# Patient Record
Sex: FEMALE | Race: WHITE | Employment: UNEMPLOYED | URBAN - METROPOLITAN AREA
[De-identification: names, ages, dates, MRNs, and addresses within clinical notes are randomized per-mention and may not be internally consistent; named-entity substitution may affect disease eponyms.]

---

## 2024-01-03 ENCOUNTER — TELEPHONE (OUTPATIENT)
Dept: ADMINISTRATIVE | Age: 24
End: 2024-01-03

## 2024-01-03 NOTE — TELEPHONE ENCOUNTER
Pt called and is 24 weeks pregnant.  Her due date is 4/22.  She was being seen by [facility]'s [place] until her insurance changed to [facility] medicaid.  They do not accept that insurance.  No availability until March.  Staff unavailable due to pt care.  Please contact pt.

## 2024-01-16 ENCOUNTER — TELEPHONE (OUTPATIENT)
Dept: OBGYN | Age: 24
End: 2024-01-16

## 2024-01-16 DIAGNOSIS — Z32.01 POSITIVE PREGNANCY TEST: Primary | ICD-10-CM

## 2024-01-16 NOTE — TELEPHONE ENCOUNTER
[facility] can not get patient in with [facility] for viability scan before her appointment on 1/29/24. Do you want her to go to radiology for one. If so, can you put in an order.

## 2024-01-29 ENCOUNTER — HOSPITAL ENCOUNTER (OUTPATIENT)
Age: 24
Discharge: HOME | End: 2024-01-29
Payer: COMMERCIAL

## 2024-01-29 ENCOUNTER — INITIAL PRENATAL (OUTPATIENT)
Dept: OBGYN | Age: 24
End: 2024-01-29
Payer: COMMERCIAL

## 2024-01-29 VITALS
WEIGHT: 148.9 LBS | DIASTOLIC BLOOD PRESSURE: 72 MMHG | BODY MASS INDEX: 30.02 KG/M2 | SYSTOLIC BLOOD PRESSURE: 105 MMHG | HEIGHT: 59 IN | HEART RATE: 97 BPM

## 2024-01-29 DIAGNOSIS — Z34.93 ENCOUNTER FOR SUPERVISION OF NORMAL PREGNANCY IN THIRD TRIMESTER, UNSPECIFIED GRAVIDITY: Primary | ICD-10-CM

## 2024-01-29 DIAGNOSIS — F17.210 CIGARETTE SMOKER ONE HALF PACK A DAY OR LESS: ICD-10-CM

## 2024-01-29 DIAGNOSIS — Z3A.28 PREGNANCY WITH 28 COMPLETED WEEKS GESTATION: ICD-10-CM

## 2024-01-29 PROBLEM — Z86.19 HISTORY OF POSITIVE PCR FOR HERPES SIMPLEX VIRUS TYPE 2 (HSV-2) DNA: Status: ACTIVE | Noted: 2023-10-23

## 2024-01-29 LAB
ABO + RH BLD: NORMAL
BLOOD GROUP ANTIBODIES SERPL: NEGATIVE
COMPONENT: NORMAL
ERYTHROCYTE [DISTWIDTH] IN BLOOD BY AUTOMATED COUNT: 12.8 % (ref 11.5–15)
GLUCOSE URINE, POC: NEGATIVE
HCT VFR BLD AUTO: 35.7 % (ref 34–48)
HGB BLD-MCNC: 11.9 G/DL (ref 11.5–15.5)
HISTORY CHECK: NORMAL
Lab: 1
MCH RBC QN AUTO: 31.9 PG (ref 26–35)
MCHC RBC AUTO-ENTMCNC: 33.3 G/DL (ref 32–34.5)
MCV RBC AUTO: 95.7 FL (ref 80–99.9)
PLATELET # BLD AUTO: 281 K/UL (ref 130–450)
PMV BLD AUTO: 10.4 FL (ref 7–12)
PROTEIN UA: NEGATIVE
RBC # BLD AUTO: 3.73 M/UL (ref 3.5–5.5)
WBC OTHER # BLD: 15.4 K/UL (ref 4.5–11.5)

## 2024-01-29 PROCEDURE — 86901 BLOOD TYPING SEROLOGIC RH(D): CPT

## 2024-01-29 PROCEDURE — 81002 URINALYSIS NONAUTO W/O SCOPE: CPT | Performed by: MIDWIFE

## 2024-01-29 PROCEDURE — 85027 COMPLETE CBC AUTOMATED: CPT

## 2024-01-29 PROCEDURE — 99203 OFFICE O/P NEW LOW 30 MIN: CPT | Performed by: MIDWIFE

## 2024-01-29 PROCEDURE — 86850 RBC ANTIBODY SCREEN: CPT

## 2024-01-29 PROCEDURE — 86900 BLOOD TYPING SEROLOGIC ABO: CPT

## 2024-01-29 PROCEDURE — 86592 SYPHILIS TEST NON-TREP QUAL: CPT

## 2024-01-29 PROCEDURE — 36415 COLL VENOUS BLD VENIPUNCTURE: CPT

## 2024-01-29 PROCEDURE — 90715 TDAP VACCINE 7 YRS/> IM: CPT | Performed by: MIDWIFE

## 2024-01-29 RX ORDER — ONDANSETRON 4 MG/1
TABLET, FILM COATED ORAL
COMMUNITY
Start: 2023-12-02

## 2024-01-29 RX ORDER — FAMOTIDINE 20 MG/1
20 TABLET, FILM COATED ORAL 2 TIMES DAILY
COMMUNITY

## 2024-01-29 RX ORDER — OMEPRAZOLE 20 MG/1
20 CAPSULE, DELAYED RELEASE ORAL DAILY
COMMUNITY
Start: 2023-12-18

## 2024-01-29 RX ORDER — MELATONIN
1 DAILY
COMMUNITY
Start: 2023-12-18

## 2024-01-29 SDOH — ECONOMIC STABILITY: INCOME INSECURITY: HOW HARD IS IT FOR YOU TO PAY FOR THE VERY BASICS LIKE FOOD, HOUSING, MEDICAL CARE, AND HEATING?: NOT HARD AT ALL

## 2024-01-29 SDOH — ECONOMIC STABILITY: HOUSING INSECURITY
IN THE LAST 12 MONTHS, WAS THERE A TIME WHEN YOU DID NOT HAVE A STEADY PLACE TO SLEEP OR SLEPT IN A SHELTER (INCLUDING NOW)?: NO

## 2024-01-29 SDOH — ECONOMIC STABILITY: FOOD INSECURITY: WITHIN THE PAST 12 MONTHS, YOU WORRIED THAT YOUR FOOD WOULD RUN OUT BEFORE YOU GOT MONEY TO BUY MORE.: NEVER TRUE

## 2024-01-29 SDOH — ECONOMIC STABILITY: FOOD INSECURITY: WITHIN THE PAST 12 MONTHS, THE FOOD YOU BOUGHT JUST DIDN'T LAST AND YOU DIDN'T HAVE MONEY TO GET MORE.: NEVER TRUE

## 2024-01-29 ASSESSMENT — PATIENT HEALTH QUESTIONNAIRE - PHQ9
2. FEELING DOWN, DEPRESSED OR HOPELESS: 0
SUM OF ALL RESPONSES TO PHQ9 QUESTIONS 1 & 2: 0
SUM OF ALL RESPONSES TO PHQ QUESTIONS 1-9: 0
1. LITTLE INTEREST OR PLEASURE IN DOING THINGS: 0

## 2024-01-30 LAB — RPR SER QL: NONREACTIVE

## 2024-01-31 ENCOUNTER — TELEPHONE (OUTPATIENT)
Dept: OBGYN | Age: 24
End: 2024-01-31

## 2024-01-31 NOTE — TELEPHONE ENCOUNTER
----- Message from Sneha Eckert APRN - CNM sent at 1/31/2024  2:23 PM EST -----  Leukocytosis, needs repeat CBC next visit.  Has she been sick with cold, etc?

## 2024-02-02 NOTE — PROGRESS NOTES
New OB Patient alert and pleasant today with no complaints.  Here today for prenatal visit.  Urine for glucose and protein obtained with negative results. Patient signed release for records to be faxed over. [facility]. Viability done at the [facility].  Fetal heart tones obtained without difficulty.  Discharge instructions given and patient directed to call the office with any questions or concerns and verbalized understanding. Tdap given in right deltoid. Patient tolerated well.    
Praf done  3rd trimester  
be limited to once per week.    Lifting:  patient should not lift more than 15 lbs using good body mechanics.  Work note can be provided    Dentist:  Patient should continue routine dental care during pregnancy.  Should an issue arise, she can have teeth drilled/filled, and receive Novocaine.

## 2024-02-07 ENCOUNTER — HOSPITAL ENCOUNTER (OUTPATIENT)
Age: 24
Discharge: HOME | End: 2024-02-07
Payer: COMMERCIAL

## 2024-02-07 LAB
AMOUNT GLUCOSE GIVEN: 50 G
COLLECT TIME, 1HR GLUCOSE: NORMAL
GLUCOSE 3H P 100 G GLC PO SERPL-MCNC: 128 MG/DL

## 2024-02-07 PROCEDURE — 82947 ASSAY GLUCOSE BLOOD QUANT: CPT

## 2024-02-14 ENCOUNTER — ROUTINE PRENATAL (OUTPATIENT)
Dept: OBGYN | Age: 24
End: 2024-02-14
Payer: COMMERCIAL

## 2024-02-14 VITALS
WEIGHT: 152 LBS | SYSTOLIC BLOOD PRESSURE: 99 MMHG | BODY MASS INDEX: 30.7 KG/M2 | HEART RATE: 89 BPM | DIASTOLIC BLOOD PRESSURE: 58 MMHG

## 2024-02-14 DIAGNOSIS — Z3A.30 PREGNANCY WITH 30 COMPLETED WEEKS GESTATION: Primary | ICD-10-CM

## 2024-02-14 LAB
GLUCOSE URINE, POC: NEGATIVE
PROTEIN UA: POSITIVE

## 2024-02-14 PROCEDURE — 81002 URINALYSIS NONAUTO W/O SCOPE: CPT | Performed by: MIDWIFE

## 2024-02-14 PROCEDURE — 99213 OFFICE O/P EST LOW 20 MIN: CPT

## 2024-02-14 PROCEDURE — 99213 OFFICE O/P EST LOW 20 MIN: CPT | Performed by: MIDWIFE

## 2024-02-14 RX ORDER — CITALOPRAM HYDROBROMIDE 10 MG/1
10 TABLET ORAL DAILY
COMMUNITY
Start: 2024-01-31 | End: 2024-02-14

## 2024-02-14 NOTE — PROGRESS NOTES
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  Boyfriend got into a motorcycle accident last week and has his arm in a sling, road rash.    Taking OTC prenatal vitamin    Bleeding no   Leaking of fluid no   Painful cramping/contractions no   Headache no   Epigastric pain no   Edema no     Fetal movement good, patient reports 10 movements in 2 hours    Objective:  See flowsheet    Vitals:    24 1301   BP: (!) 99/58   Pulse: 89       FH 30        Tdap Vaccine received at last visit      Assessment:   at 30w2d   Blood pressure WNL  Size equals dates  Total weight gain appropriate   rhogam:  Not eligible    ICD-10-CM    1. Pregnancy with 30 completed weeks gestation  Z3A.30 POCT urine qual dipstick glucose     POCT urine qual dipstick protein           Plan:    RTO 2 weeks    Orders Placed This Encounter   Procedures    POCT urine qual dipstick glucose    POCT urine qual dipstick protein      Fetal movement:  Baby should move 10 times every 2 hours.  If movements decrease below 10 in 2 hours, or decrease from what is typical for that pregnancy, patient should eat something, drink something, and lay down to count movements.  If she does not feel 10 movements after doing these things, she is to immediately proceed to L&D for NST.  She should NOT WAIT until the next day.     labor precautions discussed with patient.  Patient should report >4 contractions/tightenings in 1 hour after first emptying bladder, laying down, drinking 2 large glasses of water.  Should also promptly report any vaginal bleeding, menstrual-type cramping, dysuria, urgency or frequency.

## 2024-02-14 NOTE — PROGRESS NOTES
Patient alert and pleasant with no complaints.  Here today for prenatal visit.  Fetal heart tones obtained without difficulty.  Urine for protein obtained with trace results. Urine for glucose obtained with negative results.  Discharge instructions have been discussed with the patient. Patient advised to call our office with any questions or concerns.   Voiced understanding.

## 2024-02-28 ENCOUNTER — HOSPITAL ENCOUNTER (OUTPATIENT)
Age: 24
Discharge: HOME | End: 2024-02-28
Payer: COMMERCIAL

## 2024-02-28 ENCOUNTER — ROUTINE PRENATAL (OUTPATIENT)
Dept: OBGYN | Age: 24
End: 2024-02-28
Payer: COMMERCIAL

## 2024-02-28 VITALS
WEIGHT: 153.3 LBS | DIASTOLIC BLOOD PRESSURE: 62 MMHG | SYSTOLIC BLOOD PRESSURE: 104 MMHG | BODY MASS INDEX: 30.96 KG/M2 | HEART RATE: 100 BPM

## 2024-02-28 DIAGNOSIS — D72.829 LEUKOCYTOSIS, UNSPECIFIED TYPE: ICD-10-CM

## 2024-02-28 DIAGNOSIS — Z3A.32 32 WEEKS GESTATION OF PREGNANCY: Primary | ICD-10-CM

## 2024-02-28 LAB
ERYTHROCYTE [DISTWIDTH] IN BLOOD BY AUTOMATED COUNT: 13.3 % (ref 11.5–15)
GLUCOSE URINE, POC: NEGATIVE
HCT VFR BLD AUTO: 35.1 % (ref 34–48)
HGB BLD-MCNC: 11.6 G/DL (ref 11.5–15.5)
MCH RBC QN AUTO: 31.7 PG (ref 26–35)
MCHC RBC AUTO-ENTMCNC: 33 G/DL (ref 32–34.5)
MCV RBC AUTO: 95.9 FL (ref 80–99.9)
PLATELET # BLD AUTO: 275 K/UL (ref 130–450)
PMV BLD AUTO: 10.4 FL (ref 7–12)
PROTEIN UA: NEGATIVE
RBC # BLD AUTO: 3.66 M/UL (ref 3.5–5.5)
WBC OTHER # BLD: 14.6 K/UL (ref 4.5–11.5)

## 2024-02-28 PROCEDURE — 99213 OFFICE O/P EST LOW 20 MIN: CPT

## 2024-02-28 PROCEDURE — 99213 OFFICE O/P EST LOW 20 MIN: CPT | Performed by: MIDWIFE

## 2024-02-28 PROCEDURE — 81002 URINALYSIS NONAUTO W/O SCOPE: CPT | Performed by: MIDWIFE

## 2024-02-28 PROCEDURE — 36415 COLL VENOUS BLD VENIPUNCTURE: CPT

## 2024-02-28 PROCEDURE — 85027 COMPLETE CBC AUTOMATED: CPT

## 2024-02-28 NOTE — PROGRESS NOTES
Pt here for prenatal visit. Pt denies bleeding/cramping/lof. Good fetal movement per patient.Discharge instructions have been discussed with the patient. Patient advised to call our office with any questions or concerns.   Voiced understanding.

## 2024-02-28 NOTE — PROGRESS NOTES
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    Pregnancy with 28 completed weeks gestation    Cigarette smoker one half pack a day or less        Subjective:  doing well    Bleeding no   Leaking of fluid no   Painful cramping/contractions no   Headache no   Epigastric pain no   Edema no     Fetal movement good, patient reports 10 movements in 2 hours    Objective:  See flowsheet    Vitals:    24 1437   BP: 104/62   Pulse: 100       FH 32    1 hour   A positive, no rhogam  RPR NR  CBC showed leukocytosis, needs repeat CBC today    Assessment:   at 32w2d   Blood pressure WNL  Size equals dates  Total weight gain appropriate   rhogam:  Not eligible    ICD-10-CM    1. 32 weeks gestation of pregnancy  Z3A.32 POCT urine qual dipstick glucose     POCT urine qual dipstick protein      2. Leukocytosis, unspecified type  D72.829 CBC           Plan:    RTO 2 weeks    Orders Placed This Encounter   Procedures    CBC     Standing Status:   Future     Standing Expiration Date:   2025    POCT urine qual dipstick glucose    POCT urine qual dipstick protein      Fetal movement:  Baby should move 10 times every 2 hours.  If movements decrease below 10 in 2 hours, or decrease from what is typical for that pregnancy, patient should eat something, drink something, and lay down to count movements.  If she does not feel 10 movements after doing these things, she is to immediately proceed to L&D for NST.  She should NOT WAIT until the next day.     labor precautions discussed with patient.  Patient should report >4 contractions/tightenings in 1 hour after first emptying bladder, laying down, drinking 2 large glasses of water.  Should also promptly report any vaginal bleeding, menstrual-type cramping, dysuria, urgency or frequency.

## 2024-03-12 ENCOUNTER — ROUTINE PRENATAL (OUTPATIENT)
Dept: OBGYN | Age: 24
End: 2024-03-12
Payer: COMMERCIAL

## 2024-03-12 VITALS
SYSTOLIC BLOOD PRESSURE: 110 MMHG | DIASTOLIC BLOOD PRESSURE: 71 MMHG | BODY MASS INDEX: 31 KG/M2 | HEART RATE: 108 BPM | WEIGHT: 153.5 LBS

## 2024-03-12 DIAGNOSIS — Z34.93 ENCOUNTER FOR SUPERVISION OF NORMAL PREGNANCY IN THIRD TRIMESTER, UNSPECIFIED GRAVIDITY: Primary | ICD-10-CM

## 2024-03-12 LAB
GLUCOSE URINE, POC: NEGATIVE
PROTEIN UA: NEGATIVE

## 2024-03-12 PROCEDURE — 99213 OFFICE O/P EST LOW 20 MIN: CPT | Performed by: OBSTETRICS & GYNECOLOGY

## 2024-03-12 PROCEDURE — 81002 URINALYSIS NONAUTO W/O SCOPE: CPT | Performed by: OBSTETRICS & GYNECOLOGY

## 2024-03-12 RX ORDER — VALACYCLOVIR HYDROCHLORIDE 500 MG/1
500 TABLET, FILM COATED ORAL DAILY
Qty: 30 TABLET | Refills: 1 | Status: SHIPPED | OUTPATIENT
Start: 2024-03-12

## 2024-03-12 NOTE — PROGRESS NOTES
Patient alert and pleasant today.  Here today for prenatal visit.  Urine for glucose and protein obtained with negative results. Patient reports back pain and pelvic cramping. Discharge instructions given and patient directed to call the office with any questions or concerns and verbalized understanding.

## 2024-03-12 NOTE — PROGRESS NOTES
Good FM.  No BLD or LOF.  Emotionally doing well.  Eating without difficulty.  No SS PIH.  Reviewed third trimester changes, fetal movement and signs and symptoms of PIH reviewed Rx sent to pharmacy for Valtrex to start at 36 weeks continue until

## 2024-03-26 ENCOUNTER — ROUTINE PRENATAL (OUTPATIENT)
Dept: OBGYN | Age: 24
End: 2024-03-26
Payer: COMMERCIAL

## 2024-03-26 VITALS
SYSTOLIC BLOOD PRESSURE: 101 MMHG | HEART RATE: 88 BPM | WEIGHT: 157.1 LBS | DIASTOLIC BLOOD PRESSURE: 67 MMHG | BODY MASS INDEX: 31.73 KG/M2

## 2024-03-26 DIAGNOSIS — Z34.03 ENCOUNTER FOR SUPERVISION OF NORMAL FIRST PREGNANCY IN THIRD TRIMESTER: ICD-10-CM

## 2024-03-26 DIAGNOSIS — Z3A.36 PREGNANCY WITH 36 COMPLETED WEEKS GESTATION: Primary | ICD-10-CM

## 2024-03-26 LAB
GLUCOSE URINE, POC: NEGATIVE
PROTEIN UA: POSITIVE

## 2024-03-26 PROCEDURE — 99213 OFFICE O/P EST LOW 20 MIN: CPT | Performed by: OBSTETRICS & GYNECOLOGY

## 2024-03-26 PROCEDURE — 81002 URINALYSIS NONAUTO W/O SCOPE: CPT | Performed by: OBSTETRICS & GYNECOLOGY

## 2024-03-26 NOTE — PROGRESS NOTES
Good FM.  No BLD or LOF.  Rubella ordered as I do not see it previously done.  GBS performed today reviewed L&D.

## 2024-03-26 NOTE — PROGRESS NOTES
Patient alert and pleasant with no complaints.  Here today for prenatal visit.  Urine for glucose negative and protein obtained with trace results.  Discharge instructions have been discussed with the patient. Patient advised to call our office with any questions or concerns.   Voiced understanding.

## 2024-03-30 LAB
CULTURE: NORMAL
SPECIMEN DESCRIPTION: NORMAL

## 2024-04-03 ENCOUNTER — ROUTINE PRENATAL (OUTPATIENT)
Dept: OBGYN | Age: 24
End: 2024-04-03
Payer: COMMERCIAL

## 2024-04-03 ENCOUNTER — HOSPITAL ENCOUNTER (OUTPATIENT)
Age: 24
Discharge: HOME | End: 2024-04-03
Payer: COMMERCIAL

## 2024-04-03 ENCOUNTER — ROUTINE PRENATAL (OUTPATIENT)
Dept: OBGYN | Age: 24
End: 2024-04-03

## 2024-04-03 VITALS
WEIGHT: 157 LBS | HEART RATE: 90 BPM | DIASTOLIC BLOOD PRESSURE: 61 MMHG | BODY MASS INDEX: 31.71 KG/M2 | SYSTOLIC BLOOD PRESSURE: 93 MMHG

## 2024-04-03 DIAGNOSIS — Z34.03 ENCOUNTER FOR SUPERVISION OF NORMAL FIRST PREGNANCY IN THIRD TRIMESTER: ICD-10-CM

## 2024-04-03 DIAGNOSIS — Z3A.37 37 WEEKS GESTATION OF PREGNANCY: Primary | ICD-10-CM

## 2024-04-03 PROBLEM — Z3A.28 PREGNANCY WITH 28 COMPLETED WEEKS GESTATION: Status: RESOLVED | Noted: 2024-01-29 | Resolved: 2024-04-03

## 2024-04-03 LAB
GLUCOSE URINE, POC: NEGATIVE
PROTEIN UA: POSITIVE

## 2024-04-03 PROCEDURE — 86762 RUBELLA ANTIBODY: CPT

## 2024-04-03 PROCEDURE — 99213 OFFICE O/P EST LOW 20 MIN: CPT | Performed by: OBSTETRICS & GYNECOLOGY

## 2024-04-03 PROCEDURE — 81002 URINALYSIS NONAUTO W/O SCOPE: CPT | Performed by: OBSTETRICS & GYNECOLOGY

## 2024-04-03 PROCEDURE — 36415 COLL VENOUS BLD VENIPUNCTURE: CPT

## 2024-04-03 NOTE — PROGRESS NOTES
Good FM.  No BLD or LOF.  Emotionally doing well.  We reviewed black spots can come from rising too quickly, dehydration eating too infrequently.  As these do not last long and are not associated with other symptoms no need to further investigate.  Reviewed GBS negative.  Reviewed L&D.  Getting rubella drawn today.

## 2024-04-03 NOTE — PROGRESS NOTES
Patient alert and pleasant with complaints of seeing black spots. This started on Calixto 3/31/24  Here today for prenatal visit.  Fetal heart tones obtained without difficulty.  Urine for glucose  obtained with negative results.  Urine for protein obtained with trace results   Discharge instructions have been discussed with the patient. Patient advised to call our office with any questions or concerns.   Voiced understanding.

## 2024-04-04 LAB — RUBV IGG SERPL QL IA: NORMAL IU/ML

## 2024-04-10 ENCOUNTER — ROUTINE PRENATAL (OUTPATIENT)
Dept: OBGYN | Age: 24
End: 2024-04-10
Payer: COMMERCIAL

## 2024-04-10 VITALS
SYSTOLIC BLOOD PRESSURE: 105 MMHG | WEIGHT: 158.3 LBS | BODY MASS INDEX: 31.97 KG/M2 | DIASTOLIC BLOOD PRESSURE: 69 MMHG | HEART RATE: 96 BPM

## 2024-04-10 DIAGNOSIS — Z34.93 ENCOUNTER FOR SUPERVISION OF NORMAL PREGNANCY IN THIRD TRIMESTER, UNSPECIFIED GRAVIDITY: Primary | ICD-10-CM

## 2024-04-10 LAB
GLUCOSE URINE, POC: NEGATIVE
PROTEIN UA: NEGATIVE

## 2024-04-10 PROCEDURE — 81002 URINALYSIS NONAUTO W/O SCOPE: CPT | Performed by: OBSTETRICS & GYNECOLOGY

## 2024-04-10 PROCEDURE — 99213 OFFICE O/P EST LOW 20 MIN: CPT | Performed by: OBSTETRICS & GYNECOLOGY

## 2024-04-10 NOTE — PROGRESS NOTES
Patient alert and pleasant today.   Here today for prenatal visit.  Urine for glucose and protein obtained with negative results. Patient reports lower back pain for 2 days, pain level 6 on a 0-10 pain scale. Also reports contraction is ABD and pelvic area on and off the last couple days.   Discharge instructions given and patient directed to call the office with any questions or concerns and verbalized understanding.

## 2024-04-10 NOTE — PROGRESS NOTES
Good FM.  No BLD LOF occasional CTN's.  More pelvic pressure and low back pain.  Reviewed L&D when to proceed to the hospital reviewed FM when to proceed to the hospital.  Continue Valtrex until delivery

## 2024-04-16 ENCOUNTER — ROUTINE PRENATAL (OUTPATIENT)
Dept: OBGYN | Age: 24
End: 2024-04-16
Payer: COMMERCIAL

## 2024-04-16 VITALS
BODY MASS INDEX: 31.89 KG/M2 | DIASTOLIC BLOOD PRESSURE: 71 MMHG | SYSTOLIC BLOOD PRESSURE: 107 MMHG | HEART RATE: 87 BPM | WEIGHT: 157.9 LBS

## 2024-04-16 DIAGNOSIS — Z34.93 ENCOUNTER FOR SUPERVISION OF NORMAL PREGNANCY IN THIRD TRIMESTER, UNSPECIFIED GRAVIDITY: Primary | ICD-10-CM

## 2024-04-16 LAB
GLUCOSE URINE, POC: NEGATIVE
PROTEIN UA: NEGATIVE

## 2024-04-16 PROCEDURE — 99213 OFFICE O/P EST LOW 20 MIN: CPT | Performed by: OBSTETRICS & GYNECOLOGY

## 2024-04-16 PROCEDURE — 81002 URINALYSIS NONAUTO W/O SCOPE: CPT | Performed by: OBSTETRICS & GYNECOLOGY

## 2024-04-16 NOTE — PROGRESS NOTES
Good FM.  No BLD LOF occasional contractions.  Lots of pelvic pressure.  First available date for IOL was Saturday.  We reviewed L&D.  Reviewed Cytotec versus Pitocin.  Reviewed fetal movement counts and PIH symptoms

## 2024-04-17 ENCOUNTER — HOSPITAL ENCOUNTER (INPATIENT)
Age: 24
LOS: 4 days | Discharge: HOME | DRG: 560 | End: 2024-04-21
Attending: OBSTETRICS & GYNECOLOGY | Admitting: OBSTETRICS & GYNECOLOGY
Payer: COMMERCIAL

## 2024-04-17 PROBLEM — Z37.9 NORMAL LABOR: Status: ACTIVE | Noted: 2024-04-17

## 2024-04-17 LAB
ABO + RH BLD: NORMAL
ARM BAND NUMBER: NORMAL
BLOOD BANK SAMPLE EXPIRATION: NORMAL
BLOOD GROUP ANTIBODIES SERPL: NEGATIVE
ERYTHROCYTE [DISTWIDTH] IN BLOOD BY AUTOMATED COUNT: 13.7 % (ref 11.5–15)
HCT VFR BLD AUTO: 36.3 % (ref 34–48)
HGB BLD-MCNC: 12.1 G/DL (ref 11.5–15.5)
MCH RBC QN AUTO: 30.3 PG (ref 26–35)
MCHC RBC AUTO-ENTMCNC: 33.3 G/DL (ref 32–34.5)
MCV RBC AUTO: 90.8 FL (ref 80–99.9)
PLATELET # BLD AUTO: 352 K/UL (ref 130–450)
PMV BLD AUTO: 10.3 FL (ref 7–12)
RBC # BLD AUTO: 4 M/UL (ref 3.5–5.5)
WBC OTHER # BLD: 14.3 K/UL (ref 4.5–11.5)

## 2024-04-17 PROCEDURE — 86901 BLOOD TYPING SEROLOGIC RH(D): CPT

## 2024-04-17 PROCEDURE — 86900 BLOOD TYPING SEROLOGIC ABO: CPT

## 2024-04-17 PROCEDURE — 86850 RBC ANTIBODY SCREEN: CPT

## 2024-04-17 PROCEDURE — 80307 DRUG TEST PRSMV CHEM ANLYZR: CPT

## 2024-04-17 PROCEDURE — 85027 COMPLETE CBC AUTOMATED: CPT

## 2024-04-17 PROCEDURE — APPNB30 APP NON BILLABLE TIME 0-30 MINS: Performed by: NURSE PRACTITIONER

## 2024-04-17 PROCEDURE — 6360000002 HC RX W HCPCS: Performed by: OBSTETRICS & GYNECOLOGY

## 2024-04-17 PROCEDURE — 1220000001 HC SEMI PRIVATE L&D R&B

## 2024-04-17 RX ORDER — ONDANSETRON 4 MG/1
4 TABLET, ORALLY DISINTEGRATING ORAL EVERY 6 HOURS PRN
Status: DISCONTINUED | OUTPATIENT
Start: 2024-04-17 | End: 2024-04-21 | Stop reason: HOSPADM

## 2024-04-17 RX ORDER — SODIUM CHLORIDE, SODIUM LACTATE, POTASSIUM CHLORIDE, AND CALCIUM CHLORIDE .6; .31; .03; .02 G/100ML; G/100ML; G/100ML; G/100ML
1000 INJECTION, SOLUTION INTRAVENOUS PRN
Status: DISCONTINUED | OUTPATIENT
Start: 2024-04-17 | End: 2024-04-21 | Stop reason: HOSPADM

## 2024-04-17 RX ORDER — CARBOPROST TROMETHAMINE 250 UG/ML
250 INJECTION, SOLUTION INTRAMUSCULAR PRN
Status: DISCONTINUED | OUTPATIENT
Start: 2024-04-17 | End: 2024-04-21 | Stop reason: HOSPADM

## 2024-04-17 RX ORDER — ONDANSETRON 2 MG/ML
4 INJECTION INTRAMUSCULAR; INTRAVENOUS EVERY 6 HOURS PRN
Status: DISCONTINUED | OUTPATIENT
Start: 2024-04-17 | End: 2024-04-21 | Stop reason: HOSPADM

## 2024-04-17 RX ORDER — SODIUM CHLORIDE, SODIUM LACTATE, POTASSIUM CHLORIDE, AND CALCIUM CHLORIDE .6; .31; .03; .02 G/100ML; G/100ML; G/100ML; G/100ML
500 INJECTION, SOLUTION INTRAVENOUS PRN
Status: DISCONTINUED | OUTPATIENT
Start: 2024-04-17 | End: 2024-04-21 | Stop reason: HOSPADM

## 2024-04-17 RX ORDER — MEPERIDINE HYDROCHLORIDE 25 MG/ML
25 INJECTION INTRAMUSCULAR; INTRAVENOUS; SUBCUTANEOUS EVERY 4 HOURS PRN
Status: DISCONTINUED | OUTPATIENT
Start: 2024-04-17 | End: 2024-04-21 | Stop reason: HOSPADM

## 2024-04-17 RX ORDER — TRANEXAMIC ACID 10 MG/ML
1000 INJECTION, SOLUTION INTRAVENOUS
Status: ACTIVE | OUTPATIENT
Start: 2024-04-17 | End: 2024-04-18

## 2024-04-17 RX ORDER — MISOPROSTOL 200 UG/1
400 TABLET ORAL PRN
Status: DISCONTINUED | OUTPATIENT
Start: 2024-04-17 | End: 2024-04-21 | Stop reason: HOSPADM

## 2024-04-17 RX ORDER — METHYLERGONOVINE MALEATE 0.2 MG/ML
200 INJECTION INTRAVENOUS PRN
Status: DISCONTINUED | OUTPATIENT
Start: 2024-04-17 | End: 2024-04-21 | Stop reason: HOSPADM

## 2024-04-17 RX ORDER — SODIUM CHLORIDE, SODIUM LACTATE, POTASSIUM CHLORIDE, CALCIUM CHLORIDE 600; 310; 30; 20 MG/100ML; MG/100ML; MG/100ML; MG/100ML
INJECTION, SOLUTION INTRAVENOUS CONTINUOUS
Status: DISCONTINUED | OUTPATIENT
Start: 2024-04-17 | End: 2024-04-21 | Stop reason: HOSPADM

## 2024-04-17 RX ORDER — TERBUTALINE SULFATE 1 MG/ML
0.25 INJECTION, SOLUTION SUBCUTANEOUS
Status: ACTIVE | OUTPATIENT
Start: 2024-04-17 | End: 2024-04-18

## 2024-04-17 RX ORDER — PROMETHAZINE HYDROCHLORIDE 25 MG/ML
25 INJECTION, SOLUTION INTRAMUSCULAR; INTRAVENOUS EVERY 6 HOURS PRN
Status: DISCONTINUED | OUTPATIENT
Start: 2024-04-17 | End: 2024-04-21 | Stop reason: HOSPADM

## 2024-04-17 RX ADMIN — Medication 1 MILLI-UNITS/MIN: at 23:00

## 2024-04-18 ENCOUNTER — ANESTHESIA (OUTPATIENT)
Dept: LABOR AND DELIVERY | Age: 24
End: 2024-04-18
Payer: COMMERCIAL

## 2024-04-18 ENCOUNTER — ANESTHESIA EVENT (OUTPATIENT)
Dept: LABOR AND DELIVERY | Age: 24
End: 2024-04-18
Payer: COMMERCIAL

## 2024-04-18 LAB
AMPHET UR QL SCN: NEGATIVE
BARBITURATES UR QL SCN: NEGATIVE
BENZODIAZ UR QL: NEGATIVE
BUPRENORPHINE UR QL: NEGATIVE
CANNABINOIDS UR QL SCN: NEGATIVE
COCAINE UR QL SCN: NEGATIVE
FENTANYL UR QL: NEGATIVE
METHADONE UR QL: NEGATIVE
OPIATES UR QL SCN: NEGATIVE
OXYCODONE UR QL SCN: NEGATIVE
PCP UR QL SCN: NEGATIVE
TEST INFORMATION: NORMAL

## 2024-04-18 PROCEDURE — 6360000002 HC RX W HCPCS: Performed by: OBSTETRICS & GYNECOLOGY

## 2024-04-18 PROCEDURE — 2500000003 HC RX 250 WO HCPCS: Performed by: NURSE ANESTHETIST, CERTIFIED REGISTERED

## 2024-04-18 PROCEDURE — 3700000025 EPIDURAL BLOCK: Performed by: ANESTHESIOLOGY

## 2024-04-18 PROCEDURE — 1220000001 HC SEMI PRIVATE L&D R&B

## 2024-04-18 PROCEDURE — 51701 INSERT BLADDER CATHETER: CPT

## 2024-04-18 PROCEDURE — 2500000003 HC RX 250 WO HCPCS

## 2024-04-18 RX ORDER — NALOXONE HYDROCHLORIDE 0.4 MG/ML
INJECTION, SOLUTION INTRAMUSCULAR; INTRAVENOUS; SUBCUTANEOUS PRN
Status: DISCONTINUED | OUTPATIENT
Start: 2024-04-18 | End: 2024-04-21 | Stop reason: HOSPADM

## 2024-04-18 RX ORDER — ACETAMINOPHEN 650 MG
TABLET, EXTENDED RELEASE ORAL
Status: DISPENSED
Start: 2024-04-18 | End: 2024-04-18

## 2024-04-18 RX ORDER — EPHEDRINE SULFATE/0.9% NACL/PF 50 MG/5 ML
5 SYRINGE (ML) INTRAVENOUS AS NEEDED
Status: ACTIVE | OUTPATIENT
Start: 2024-04-18 | End: 2024-04-18

## 2024-04-18 RX ORDER — BUPIVACAINE HYDROCHLORIDE 2.5 MG/ML
INJECTION, SOLUTION EPIDURAL; INFILTRATION; INTRACAUDAL
Status: DISPENSED
Start: 2024-04-18 | End: 2024-04-19

## 2024-04-18 RX ORDER — LIDOCAINE HYDROCHLORIDE 10 MG/ML
INJECTION, SOLUTION INFILTRATION; PERINEURAL
Status: DISPENSED
Start: 2024-04-18 | End: 2024-04-18

## 2024-04-18 RX ADMIN — Medication 15 ML/HR: at 18:06

## 2024-04-18 RX ADMIN — Medication 3 ML: at 08:46

## 2024-04-18 RX ADMIN — Medication 15 ML/HR: at 08:48

## 2024-04-18 RX ADMIN — Medication 20 MILLI-UNITS/MIN: at 22:36

## 2024-04-18 ASSESSMENT — LIFESTYLE VARIABLES: SMOKING_STATUS: 1

## 2024-04-18 NOTE — ANESTHESIA PRE PROCEDURE
chance-units/min IntraVENous Continuous PRN Mariaelena Ponce APRN - CNP        And    oxytocin (PITOCIN) 10 unit bolus from the bag  10 Units IntraVENous PRN Mariaelena Ponce APRN - CNP        terbutaline (BRETHINE) injection 0.25 mg  0.25 mg SubCUTAneous Once PRN Mariaelena Ponce APRN - CNP        ondansetron (ZOFRAN) injection 4 mg  4 mg IntraVENous Q6H PRN Mariaelena Ponce APRN - CNP        Or    ondansetron (ZOFRAN-ODT) disintegrating tablet 4 mg  4 mg Oral Q6H PRN Mariaelena Ponce APRN - CNP        oxytocin (PITOCIN) 15 units in 250 mL infusion  1-20 chance-units/min IntraVENous Continuous Canelo Marte MD 4 mL/hr at 04/18/24 0230 4 chance-units/min at 04/18/24 0230    meperidine (DEMEROL) injection 25 mg  25 mg IntraVENous Q4H PRN Canelo Marte MD        promethazine (PHENERGAN) injection 25 mg  25 mg IntraMUSCular Q6H PRN Canelo Marte MD           Allergies:  No Known Allergies    Problem List:    Patient Active Problem List   Diagnosis Code    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 Z86.19    Cigarette smoker one half pack a day or less F17.210    Normal labor O80, Z37.9       Past Medical History:        Diagnosis Date    Positive test for herpes simplex virus (HSV) antibody        Past Surgical History:        Procedure Laterality Date    KNEE SURGERY Right     WRIST SURGERY Right        Social History:    Social History     Tobacco Use    Smoking status: Some Days     Current packs/day: 0.25     Average packs/day: 0.3 packs/day for 6.2 years (1.6 ttl pk-yrs)     Types: Cigarettes     Start date: 1/29/2018    Smokeless tobacco: Never   Substance Use Topics    Alcohol use: Not Currently                                Ready to quit: Not Answered  Counseling given: Not Answered      Vital Signs (Current):   Vitals:    04/17/24 2124 04/17/24 2145 04/17/24 2205   BP: 138/63  107/70   Pulse: (!) 113  85   Resp: 17  16   Temp: 37.1 °C (98.7 °F)

## 2024-04-18 NOTE — ANESTHESIA PROCEDURE NOTES
Epidural Block    Patient location during procedure: OB  Start time: 4/18/2024 8:36 AM  End time: 4/18/2024 8:46 AM  Reason for block: labor epidural  Staffing  Performed: resident/CRNA   Anesthesiologist: James Enriquez DO  Resident/CRNA: Lamont Corona APRN - CRNA  Other anesthesia staff: Celio Roblero RN  Performed by: Celio Roblero RN  Authorized by: Canelo Marte MD    Epidural  Patient position: sitting  Prep: Betadine  Patient monitoring: cardiac monitor, continuous pulse ox and frequent blood pressure checks  Approach: midline  Location: L3-4  Injection technique: LEANN saline  Provider prep: mask and sterile gloves  Needle  Needle type: Tuohy   Needle gauge: 18 G  Needle length: 3.5 in  Needle insertion depth: 6 cm  Catheter type: end hole  Catheter size: 20 G.  Catheter at skin depth: 17 cm  Test dose: negativeCatheter Secured: tegaderm and tape  Assessment  Sensory level: T6  Hemodynamics: stable  Attempts: 1  Outcomes: uncomplicated and patient tolerated procedure well  Preanesthetic Checklist  Completed: patient identified, IV checked, site marked, risks and benefits discussed, surgical/procedural consents, equipment checked, pre-op evaluation, timeout performed, anesthesia consent given, oxygen available and monitors applied/VS acknowledged

## 2024-04-18 NOTE — PROGRESS NOTES
39w2d presents to the unit from home w/ c/o LOF. Denies any complications with this pregnancy. States +FM. Denies any active VB. On cont EFM. VSS. Support person at bedside, call light within reach. House CNP notified.

## 2024-04-18 NOTE — PROGRESS NOTES
Patient seen  Denies herpetic lesions or prodromal symptoms  Denies prenatal problems  Vulva no herpetic lesions  Cx 5cm/90/0 vtx  Intran and fse placed  Fht's 120's with accels  Ctx's 2-3 min  Pitocin 10mu/min

## 2024-04-18 NOTE — PROGRESS NOTES
Assumed care of pt.  Pt complaining of 10/10 ctx pain as well as LOF. Pt denies vaginal  bleeding. Pt is requesting an epidural. Anesthesia aware. EFM adjusted. Call light within reach

## 2024-04-18 NOTE — H&P
Department of Obstetrics and Gynecology  Nurse Practitioner Obstetrics History and Physical        CHIEF COMPLAINT:  sromContractions    HISTORY OF PRESENT ILLNESS:    The patient is a 24 y.o. female , Patient's last menstrual period was 2023.,  at 39w2d. Pt presents to l&d with complaints of srom and cramping. Pt denies vb or decreased fm.   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 She started smoking about 6 years ago. She has a 1.6 pack-year smoking history. She has never used smokeless tobacco.  ETOH:   reports that she does not currently use alcohol.  DRUGS:   reports no history of drug use.  Family History:       Problem Relation Age of Onset    Charcot-Inés-Tooth disease Mother      Medications Prior to Admission:  Medications Prior to Admission: Prenatal MV-Min-Fe Fum-FA-DHA (PRENATAL 1 PO), Take by mouth  valACYclovir (VALTREX) 500 MG tablet, Take 1 tablet by mouth daily  vitamin D3 (CHOLECALCIFEROL)

## 2024-04-19 PROBLEM — Z3A.39 39 WEEKS GESTATION OF PREGNANCY: Status: ACTIVE | Noted: 2024-04-19

## 2024-04-19 PROCEDURE — 59409 OBSTETRICAL CARE: CPT | Performed by: MIDWIFE

## 2024-04-19 PROCEDURE — 6360000002 HC RX W HCPCS: Performed by: NURSE PRACTITIONER

## 2024-04-19 PROCEDURE — 6370000000 HC RX 637 (ALT 250 FOR IP)

## 2024-04-19 PROCEDURE — 6370000000 HC RX 637 (ALT 250 FOR IP): Performed by: OBSTETRICS & GYNECOLOGY

## 2024-04-19 PROCEDURE — 1220000000 HC SEMI PRIVATE OB R&B

## 2024-04-19 PROCEDURE — 2580000003 HC RX 258: Performed by: OBSTETRICS & GYNECOLOGY

## 2024-04-19 PROCEDURE — 0UQMXZZ REPAIR VULVA, EXTERNAL APPROACH: ICD-10-PCS | Performed by: MIDWIFE

## 2024-04-19 RX ORDER — MODIFIED LANOLIN
OINTMENT (GRAM) TOPICAL PRN
Status: DISCONTINUED | OUTPATIENT
Start: 2024-04-19 | End: 2024-04-21 | Stop reason: HOSPADM

## 2024-04-19 RX ORDER — ONDANSETRON 2 MG/ML
4 INJECTION INTRAMUSCULAR; INTRAVENOUS EVERY 6 HOURS PRN
Status: DISCONTINUED | OUTPATIENT
Start: 2024-04-19 | End: 2024-04-21 | Stop reason: HOSPADM

## 2024-04-19 RX ORDER — SODIUM CHLORIDE 9 MG/ML
INJECTION, SOLUTION INTRAVENOUS PRN
Status: DISCONTINUED | OUTPATIENT
Start: 2024-04-19 | End: 2024-04-21 | Stop reason: HOSPADM

## 2024-04-19 RX ORDER — DOCUSATE SODIUM 100 MG/1
100 CAPSULE, LIQUID FILLED ORAL 2 TIMES DAILY
Status: DISCONTINUED | OUTPATIENT
Start: 2024-04-19 | End: 2024-04-21 | Stop reason: HOSPADM

## 2024-04-19 RX ORDER — SODIUM CHLORIDE 0.9 % (FLUSH) 0.9 %
5-40 SYRINGE (ML) INJECTION PRN
Status: DISCONTINUED | OUTPATIENT
Start: 2024-04-19 | End: 2024-04-21 | Stop reason: HOSPADM

## 2024-04-19 RX ORDER — ONDANSETRON 4 MG/1
8 TABLET, ORALLY DISINTEGRATING ORAL EVERY 8 HOURS PRN
Status: DISCONTINUED | OUTPATIENT
Start: 2024-04-19 | End: 2024-04-21 | Stop reason: HOSPADM

## 2024-04-19 RX ORDER — ACETAMINOPHEN 500 MG
1000 TABLET ORAL EVERY 8 HOURS SCHEDULED
Status: DISCONTINUED | OUTPATIENT
Start: 2024-04-19 | End: 2024-04-21 | Stop reason: HOSPADM

## 2024-04-19 RX ORDER — IBUPROFEN 800 MG/1
TABLET ORAL
Status: COMPLETED
Start: 2024-04-19 | End: 2024-04-19

## 2024-04-19 RX ORDER — IBUPROFEN 800 MG/1
800 TABLET ORAL EVERY 8 HOURS SCHEDULED
Status: DISCONTINUED | OUTPATIENT
Start: 2024-04-19 | End: 2024-04-21 | Stop reason: HOSPADM

## 2024-04-19 RX ORDER — SODIUM CHLORIDE 0.9 % (FLUSH) 0.9 %
5-40 SYRINGE (ML) INJECTION EVERY 12 HOURS SCHEDULED
Status: DISCONTINUED | OUTPATIENT
Start: 2024-04-19 | End: 2024-04-21 | Stop reason: HOSPADM

## 2024-04-19 RX ADMIN — Medication: at 08:11

## 2024-04-19 RX ADMIN — ACETAMINOPHEN 1000 MG: 500 TABLET ORAL at 23:07

## 2024-04-19 RX ADMIN — DOCUSATE SODIUM 100 MG: 100 CAPSULE, LIQUID FILLED ORAL at 08:11

## 2024-04-19 RX ADMIN — SODIUM CHLORIDE, PRESERVATIVE FREE 10 ML: 5 INJECTION INTRAVENOUS at 08:11

## 2024-04-19 RX ADMIN — Medication 166.7 ML: at 01:49

## 2024-04-19 RX ADMIN — ONDANSETRON 4 MG: 2 INJECTION INTRAMUSCULAR; INTRAVENOUS at 00:49

## 2024-04-19 RX ADMIN — IBUPROFEN 800 MG: 800 TABLET, FILM COATED ORAL at 07:36

## 2024-04-19 RX ADMIN — IBUPROFEN 800 MG: 800 TABLET, FILM COATED ORAL at 18:02

## 2024-04-19 RX ADMIN — ACETAMINOPHEN 1000 MG: 500 TABLET ORAL at 08:11

## 2024-04-19 RX ADMIN — DOCUSATE SODIUM 100 MG: 100 CAPSULE, LIQUID FILLED ORAL at 20:47

## 2024-04-19 RX ADMIN — Medication 87.3 MILLI-UNITS/MIN: at 02:00

## 2024-04-19 RX ADMIN — ACETAMINOPHEN 1000 MG: 500 TABLET ORAL at 15:23

## 2024-04-19 ASSESSMENT — PAIN SCALES - GENERAL
PAINLEVEL_OUTOF10: 5
PAINLEVEL_OUTOF10: 6
PAINLEVEL_OUTOF10: 5
PAINLEVEL_OUTOF10: 6
PAINLEVEL_OUTOF10: 5

## 2024-04-19 ASSESSMENT — PAIN DESCRIPTION - LOCATION
LOCATION: PERINEUM;ABDOMEN
LOCATION: ABDOMEN

## 2024-04-19 ASSESSMENT — PAIN DESCRIPTION - ORIENTATION
ORIENTATION: LOWER
ORIENTATION: LOWER;MID

## 2024-04-19 ASSESSMENT — PAIN DESCRIPTION - DESCRIPTORS
DESCRIPTORS: CRAMPING;TENDER
DESCRIPTORS: ACHING;CRAMPING;DISCOMFORT

## 2024-04-19 ASSESSMENT — PAIN - FUNCTIONAL ASSESSMENT
PAIN_FUNCTIONAL_ASSESSMENT: ACTIVITIES ARE NOT PREVENTED
PAIN_FUNCTIONAL_ASSESSMENT: ACTIVITIES ARE NOT PREVENTED

## 2024-04-19 NOTE — LACTATION NOTE
Encouraged skin to skin and frequent attempts at breast to stimulate milk production. Instructed on normal infant behavior in the first 12-24 hours and importance of stimulating the baby frequently to eat during this time. Reviewed hand expression, and encouraged to hand express drops of colostrum when baby is sleepy. Instructed that baby may also feed 8-12 times a day- cluster feeding at times- as her milk supply is being established. Educated on making sure infant has an open airway while breastfeeding and skin to skin. Instructed on hunger cues and waking techniques to try. Reviewed signs of adequate I & O; allow baby to feed ad brenda and not to limit time at breast. Breastfeeding booklet provided with review of its contents. Encouraged to call with any concerns.     Uyen Morales, CLS

## 2024-04-19 NOTE — ANESTHESIA POSTPROCEDURE EVALUATION
Department of Anesthesiology  Postprocedure Note    Patient: Irma Willingham  MRN: [id number]  YOB: 2000  Date of evaluation: 4/19/2024    Procedure Summary       Date: 04/18/24 Room / Location:     Anesthesia Start: 0833 Anesthesia Stop: 04/19/24 0143    Procedure: Labor Analgesia Diagnosis:     Scheduled Providers:  Responsible Provider: James Enriquez DO    Anesthesia Type: epidural ASA Status: 3            Anesthesia Type: No value filed.    Fawad Phase I:      Fawad Phase II:      Anesthesia Post Evaluation    Patient location during evaluation: PACU  Patient participation: complete - patient participated  Level of consciousness: awake  Airway patency: patent  Nausea & Vomiting: no nausea and no vomiting  Cardiovascular status: hemodynamically stable  Respiratory status: acceptable  Hydration status: stable  Pain management: adequate    No notable events documented.

## 2024-04-19 NOTE — PROGRESS NOTES
Patient oriented to room 321, call light and telephone usage shown to the patient. New admission vital signs and assessment completed as charted. New admission paperwork gone over with the patient including the TDAP and Hepatitis B vaccines. The patient had the TDAP and  she would like the baby to receive the Hepatitis B vaccine. Instructed the patient to call for first time out of bed. All questions answered.

## 2024-04-19 NOTE — L&D DELIVERY NOTE
Department of Obstetrics and Gynecology  Spontaneous Vaginal Delivery Note      Pre-operative Diagnosis:  Term pregnancy and Induced labor    Post-operative Diagnosis:  Living  infant(s) and Female    Infant Wt:   Birth Weight: N/A       APGARS:     One minute 8  Five minutes 9     Anesthesia:  epidural anesthesia      Delivery Summary: Spontaneous vaginal delivery of a female baby  at 0143.  Airways of the baby cleared of all secretion with suction bulb as soon as the head was out from the vagina.  Placenta and menbranes, complete and normal were delivered at 0149.   Perineum: rt labial laceration, 1 st degree repaired  Mother and Baby are in good condition.    Labor & Delivery Summary  Dilation Complete Date: 24  Dilation Complete Time: 0108    Specimen:  cord gases     Estimated blood loss: 200cc              Condition:  mother and infant stable, skin to skin    Blood Type and Rh: A POSITIVE        Rubella Immunity Status:   Immune           Infant Feeding:    breast    Attending Attestation: I was present and scrubbed for the entire procedure.

## 2024-04-20 LAB
ERYTHROCYTE [DISTWIDTH] IN BLOOD BY AUTOMATED COUNT: 14.1 % (ref 11.5–15)
HCT VFR BLD AUTO: 27.7 % (ref 34–48)
HGB BLD-MCNC: 8.8 G/DL (ref 11.5–15.5)
MCH RBC QN AUTO: 29.6 PG (ref 26–35)
MCHC RBC AUTO-ENTMCNC: 31.8 G/DL (ref 32–34.5)
MCV RBC AUTO: 93.3 FL (ref 80–99.9)
PLATELET # BLD AUTO: 247 K/UL (ref 130–450)
PMV BLD AUTO: 10.2 FL (ref 7–12)
RBC # BLD AUTO: 2.97 M/UL (ref 3.5–5.5)
WBC OTHER # BLD: 13.8 K/UL (ref 4.5–11.5)

## 2024-04-20 PROCEDURE — 6360000002 HC RX W HCPCS: Performed by: MIDWIFE

## 2024-04-20 PROCEDURE — 2580000003 HC RX 258: Performed by: MIDWIFE

## 2024-04-20 PROCEDURE — 85027 COMPLETE CBC AUTOMATED: CPT

## 2024-04-20 PROCEDURE — 6370000000 HC RX 637 (ALT 250 FOR IP): Performed by: OBSTETRICS & GYNECOLOGY

## 2024-04-20 PROCEDURE — 99232 SBSQ HOSP IP/OBS MODERATE 35: CPT | Performed by: MIDWIFE

## 2024-04-20 PROCEDURE — 1220000000 HC SEMI PRIVATE OB R&B

## 2024-04-20 RX ADMIN — ACETAMINOPHEN 1000 MG: 500 TABLET ORAL at 16:40

## 2024-04-20 RX ADMIN — IBUPROFEN 800 MG: 800 TABLET, FILM COATED ORAL at 18:34

## 2024-04-20 RX ADMIN — SODIUM CHLORIDE 300 MG: 9 INJECTION, SOLUTION INTRAVENOUS at 10:41

## 2024-04-20 RX ADMIN — ACETAMINOPHEN 1000 MG: 500 TABLET ORAL at 08:31

## 2024-04-20 RX ADMIN — IBUPROFEN 800 MG: 800 TABLET, FILM COATED ORAL at 10:45

## 2024-04-20 RX ADMIN — IBUPROFEN 800 MG: 800 TABLET, FILM COATED ORAL at 01:55

## 2024-04-20 RX ADMIN — DOCUSATE SODIUM 100 MG: 100 CAPSULE, LIQUID FILLED ORAL at 20:33

## 2024-04-20 RX ADMIN — DOCUSATE SODIUM 100 MG: 100 CAPSULE, LIQUID FILLED ORAL at 08:31

## 2024-04-20 ASSESSMENT — PAIN DESCRIPTION - ORIENTATION
ORIENTATION: LOWER
ORIENTATION: LOWER
ORIENTATION: MID;LOWER
ORIENTATION: LOWER

## 2024-04-20 ASSESSMENT — PAIN SCALES - GENERAL
PAINLEVEL_OUTOF10: 10
PAINLEVEL_OUTOF10: 6
PAINLEVEL_OUTOF10: 4
PAINLEVEL_OUTOF10: 6
PAINLEVEL_OUTOF10: 4

## 2024-04-20 ASSESSMENT — PAIN DESCRIPTION - DESCRIPTORS
DESCRIPTORS: ACHING;CRAMPING;DISCOMFORT
DESCRIPTORS: TENDER;CRAMPING

## 2024-04-20 ASSESSMENT — PAIN DESCRIPTION - LOCATION
LOCATION: PERINEUM;ABDOMEN
LOCATION: ABDOMEN

## 2024-04-20 ASSESSMENT — PAIN DESCRIPTION - PAIN TYPE
TYPE: ACUTE PAIN
TYPE: ACUTE PAIN

## 2024-04-20 ASSESSMENT — PAIN - FUNCTIONAL ASSESSMENT
PAIN_FUNCTIONAL_ASSESSMENT: ACTIVITIES ARE NOT PREVENTED

## 2024-04-20 NOTE — LACTATION NOTE
Mom pumped with the hospital grade ebp and was able to get 20 ml's of colostrum. Encouraged mom to feed it to the baby for the next feeding. Support provided.

## 2024-04-20 NOTE — PROGRESS NOTES
PPD#1, prolonged ROM    S:  Feels good, \"sore\"  Denies chills, nausea  Has been up and ambulated in hallway  States bleeding minimal  Breast and bottle feeding    O:  VSS,a febrile  WBC 13.8, HGB 8.8/27.7  Abd soft, fundus fir, 1 above U  Light rubra  No edema    A:  24 year old  on PPD#1  Prolonged ROM  Anemia  Rh positive    P:  IV iron  Advance care

## 2024-04-20 NOTE — PROGRESS NOTES
New IV site placed and IV venofer started per CNM orders. PPD charted and asked pt to watch [employer] videos when able.

## 2024-04-20 NOTE — LACTATION NOTE
Mom is formula feeding, has not started pumping yet. Encouraged mom to initiate  pumping routine to stimulate and establish supply. Support provided and encouraged to call with any needs.

## 2024-04-20 NOTE — PROGRESS NOTES
[place] Hearing screening results were discussed with parent. Questions answered. Brochure given to parent. Advised to monitor developmental milestones and contact physician for any concerns.   Fatmata Lehman, AuD

## 2024-04-21 VITALS
BODY MASS INDEX: 31.65 KG/M2 | OXYGEN SATURATION: 96 % | HEIGHT: 59 IN | TEMPERATURE: 97.8 F | SYSTOLIC BLOOD PRESSURE: 96 MMHG | WEIGHT: 157 LBS | HEART RATE: 71 BPM | DIASTOLIC BLOOD PRESSURE: 50 MMHG | RESPIRATION RATE: 16 BRPM

## 2024-04-21 PROCEDURE — 6370000000 HC RX 637 (ALT 250 FOR IP): Performed by: OBSTETRICS & GYNECOLOGY

## 2024-04-21 PROCEDURE — 99024 POSTOP FOLLOW-UP VISIT: CPT | Performed by: NURSE PRACTITIONER

## 2024-04-21 RX ORDER — IBUPROFEN 800 MG/1
800 TABLET ORAL EVERY 8 HOURS SCHEDULED
Qty: 120 TABLET | Refills: 3 | Status: SHIPPED | OUTPATIENT
Start: 2024-04-21

## 2024-04-21 RX ADMIN — ACETAMINOPHEN 1000 MG: 500 TABLET ORAL at 08:11

## 2024-04-21 RX ADMIN — DOCUSATE SODIUM 100 MG: 100 CAPSULE, LIQUID FILLED ORAL at 08:12

## 2024-04-21 RX ADMIN — IBUPROFEN 800 MG: 800 TABLET, FILM COATED ORAL at 04:19

## 2024-04-21 ASSESSMENT — PAIN - FUNCTIONAL ASSESSMENT: PAIN_FUNCTIONAL_ASSESSMENT: ACTIVITIES ARE NOT PREVENTED

## 2024-04-21 ASSESSMENT — PAIN DESCRIPTION - DESCRIPTORS: DESCRIPTORS: CRAMPING

## 2024-04-21 ASSESSMENT — PAIN SCALES - GENERAL
PAINLEVEL_OUTOF10: 0
PAINLEVEL_OUTOF10: 7

## 2024-04-21 ASSESSMENT — PAIN DESCRIPTION - ORIENTATION: ORIENTATION: MID;LOWER

## 2024-04-21 ASSESSMENT — PAIN DESCRIPTION - LOCATION: LOCATION: ABDOMEN

## 2024-04-21 NOTE — PROGRESS NOTES
PPD #2     Patient:  Irma Willingham     Admit Date:  2024  9:12 PM  Medical Record Number:  [id number]   Today's Date: 2024    S: No complaints; tolerating diet: affirms ; ambulating well: affirms; voiding without difficulty:  affirms; bm: affirms; flatus: affirms; pain meds appropriate: affirms; vaginal bleeding: thin lochia.    O:   Vitals:    24 0956 24 1518 24 2300 24 0811   BP: (!) 101/57 (!) 117/55 112/64 (!) 96/50   Pulse: 64 81 78 71   Resp: 16 18 16 16   Temp: 97.4 °F (36.3 °C) 97.8 °F (36.6 °C) 97.5 °F (36.4 °C) 97.8 °F (36.6 °C)   TempSrc: Oral Temporal Temporal Temporal   SpO2: 100% 98% 99% 96%   Weight:       Height:         Gen: A&O, cooperative, pleasant   Heart: RRR   Lungs: CTA b/l   Abd; soft, NT, non distended, +BS  Back: no CVA or paraspinal tenderness   Ext: No clubbing, cyanosis, edema:trace , no cords palpable, no calf tenderness   Neuro: intact   Uterus: firm, well contracted, nt   Perineum: intact, healing well   Francesca pad: thin lochia    Lab Results   Component Value Date    HGB 8.8 (L) 2024    HCT 27.7 (L) 2024      Recent Results (from the past 72 hour(s))   CBC    Collection Time: 24  6:10 AM   Result Value Ref Range    WBC 13.8 (H) 4.5 - 11.5 k/uL    RBC 2.97 (L) 3.50 - 5.50 m/uL    Hemoglobin 8.8 (L) 11.5 - 15.5 g/dL    Hematocrit 27.7 (L) 34.0 - 48.0 %    MCV 93.3 80.0 - 99.9 fL    MCH 29.6 26.0 - 35.0 pg    MCHC 31.8 (L) 32.0 - 34.5 g/dL    RDW 14.1 11.5 - 15.0 %    Platelets 247 130 - 450 k/uL    MPV 10.2 7.0 - 12.0 fL       A: 24 y.o. 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   Pt feeling better after

## 2024-04-21 NOTE — DISCHARGE SUMMARY
Obstetric Discharge Summary    Patient Name:  Irma Willingham    Medical Record Number:  [id number]    Attending:  LEANDRO South CNP    Date of Admission:  2024    Date of Discharge:        Admitting Diagnosis  IUP 39.4 weeks-  OB History          1    Para   1    Term   1            AB        Living   1         SAB        IAB        Ectopic        Molar        Multiple   0    Live Births   1                Reasons for Admission on 2024  9:12 PM  Normal labor [O80, Z37.9]  No comment available  Onset of Labor    Intrapartum Procedures   Spontaneous Vaginal Delivery: See Labor and Delivery Summary       Postpartum/Operative Complications       [place] Data  Information for the patient's :  Ramon Willingham [[id number]]   female   Birth Weight: 2.82 kg (6 lb 3.5 oz)   Discharge With Mother  Complications: No    Discharge Diagnosis normal            Discharge Meds:       Medication List        START taking these medications      ibuprofen 800 MG tablet  Commonly known as: ADVIL;MOTRIN  Take 1 tablet by mouth every 8 hours            CONTINUE taking these medications      omeprazole 20 MG delayed release capsule  Commonly known as: PRILOSEC     PRENATAL 1 PO     valACYclovir 500 MG tablet  Commonly known as: Valtrex  Take 1 tablet by mouth daily     vitamin D3 25 MCG (1000 UT) Tabs tablet               Where to Get Your Medications        These medications were sent to [id number] - [address] -  [phone] - F [phone]  [address] [place] [id number]      Phone: [phone]   ibuprofen 800 MG tablet         Discharge Information  Current Discharge Medication List        START taking these medications    Details   ibuprofen (ADVIL;MOTRIN) 800 MG tablet Take 1 tablet by mouth every 8 hours  Qty: 120 tablet, Refills: 3           CONTINUE these medications which have NOT CHANGED    Details   Prenatal MV-Min-Fe Fum-FA-DHA

## 2024-04-21 NOTE — LACTATION NOTE
Mom plans on pumping at home. Encouraged mom to maintain pumping routine to stimulate and establish supply. Support provided and encouraged to call with any needs.

## 2024-04-21 NOTE — DISCHARGE INSTRUCTIONS
Follow-up with your OB doctor in 1 week if  delivery or in  6 weeks for vaginal delivery unless otherwise instructed.   Call office for an appointment.    For breastfeeding support, you can contact our lactation specialists at [phone] or [phone]    DIET  Eat a well balanced diet focusing on foods high in fiber and protein  Drink plenty of fluids especially water.  To avoid constipation you may take a mild stool softener as recommended by your doctor or midwife.    ACTIVITY  Gradually increase your activity.  Resume exercise regimen only after advised by your doctor or midwife.  Avoid lifting anything heavier than your baby or a gallon of milk for SIX weeks.   Avoid driving until your doctor or midwife has given their approval.  Rise slowly from a lying to sitting and then a standing position.  Climb stairs one at a time.  Use caution when carrying your baby up and down the stairs.  No sexual activity for 6 weeks or until advised by your doctor - Nothing in vagina: intercourse, tampons, or douching.   Be prepared to discuss family planning at your follow-up OB visit.   You may feel tired or have a lack of energy.  You may continue your prenatal vitamin to replenish nutrients post delivery.  Nap when baby naps to catch up on sleep.  May return to work or school in 6 weeks or as directed by OB.     EMOTIONS  You may feed chapa, sad, teary, & overwhelmed.  Contact your OB provider if you feel you may be showing signs of postpartum depression, or have thoughts of harming yourself or your infant.  If infant will not stop crying, contact another adult for help or place infant in their crib on their back and take a break.  NEVER shake your infant.      BLEEDING  Vaginal bleeding will decrease in amount over the next few weeks.  You will notice that as your activity increases, your flow may increase.  This is your body's way of telling you, you need to take things easier and rest more often.  Call your

## 2024-05-29 ENCOUNTER — POSTPARTUM VISIT (OUTPATIENT)
Dept: OBGYN | Age: 24
End: 2024-05-29
Payer: COMMERCIAL

## 2024-05-29 VITALS — SYSTOLIC BLOOD PRESSURE: 102 MMHG | DIASTOLIC BLOOD PRESSURE: 62 MMHG | HEART RATE: 78 BPM

## 2024-05-29 PROBLEM — Z37.9 NORMAL LABOR: Status: RESOLVED | Noted: 2024-04-17 | Resolved: 2024-05-29

## 2024-05-29 PROBLEM — Z3A.39 39 WEEKS GESTATION OF PREGNANCY: Status: RESOLVED | Noted: 2024-04-19 | Resolved: 2024-05-29

## 2024-05-29 LAB
CONTROL: NORMAL
PREGNANCY TEST URINE, POC: NEGATIVE

## 2024-05-29 PROCEDURE — 81025 URINE PREGNANCY TEST: CPT | Performed by: OBSTETRICS & GYNECOLOGY

## 2024-05-29 PROCEDURE — 99213 OFFICE O/P EST LOW 20 MIN: CPT | Performed by: OBSTETRICS & GYNECOLOGY

## 2024-05-29 PROCEDURE — 99214 OFFICE O/P EST MOD 30 MIN: CPT | Performed by: OBSTETRICS & GYNECOLOGY

## 2024-05-29 PROCEDURE — 96372 THER/PROPH/DIAG INJ SC/IM: CPT

## 2024-05-29 RX ORDER — MEDROXYPROGESTERONE ACETATE 150 MG/ML
150 INJECTION, SUSPENSION INTRAMUSCULAR ONCE
Status: COMPLETED | OUTPATIENT
Start: 2024-05-29 | End: 2024-05-29

## 2024-05-29 RX ADMIN — MEDROXYPROGESTERONE ACETATE 150 MG: 150 INJECTION, SUSPENSION INTRAMUSCULAR at 15:43

## 2024-05-29 ASSESSMENT — ENCOUNTER SYMPTOMS
COUGH: 0
ABDOMINAL PAIN: 0
BLOOD IN STOOL: 0
DIARRHEA: 0
VOMITING: 0
SHORTNESS OF BREATH: 0
NAUSEA: 0
CONSTIPATION: 0
WHEEZING: 0

## 2024-05-29 NOTE — PROGRESS NOTES
Patient here for OB postpartum 6 wk F/U. Reports she is bottle feeding. Wants to discuss getting on depo injection. Unable to obtain weight to due patient holding baby. Patient is not vaginally bleeding or spotting at this time. Pregnancy test obtained with negative results. Depo given in right deltoid. Patient tolerated well. Pap obtained, labeled and sent to lab. Discharge instructions given to patient. Advised patient to call office if any concerns. Patient voiced understanding

## 2024-05-29 NOTE — PROGRESS NOTES
Irma Willingham is a 24-year-old G1, P1 female who had an  on 2439 weeks 4 days delivering a 6 pound 3.5 ounce female.  Pregnancy was uncomplicated.  FOB is involved and she feels safe with him.  No note required for work or school.  Desires Depo-Provera for contraception.  Denies anxiety depression self-harm or suicidal ideation.  No previous Pap documented      Patient presents for postpartum exam    Past Medical History:   Diagnosis Date   • Positive test for herpes simplex virus (HSV) antibody         Past Surgical History:   Procedure Laterality Date   • KNEE SURGERY Right    • WRIST SURGERY Right         Family History   Problem Relation Age of Onset   • Charcot-Inés-Tooth disease Mother           Current Outpatient Medications:   •  valACYclovir (VALTREX) 500 MG tablet, Take 1 tablet by mouth daily, Disp: 30 tablet, Rfl: 1  •  vitamin D3 (CHOLECALCIFEROL) 25 MCG (1000 UT) TABS tablet, Take 1 tablet by mouth daily, Disp: , Rfl:   •  omeprazole (PRILOSEC) 20 MG delayed release capsule, Take 1 capsule by mouth daily, Disp: , Rfl:      No Known Allergies     Social History       Tobacco History       Smoking Status  Some Days Smoking Start Date  2018 Current Packs/Day  0.3 packs/day Average Packs/Day  0.3 packs/day for 6.3 years (1.6 ttl pk-yrs) Smoking Tobacco Type  Cigarettes since 2018   Pack Year History     Packs/Day From To Years    0.25 2018  6.3      Smokeless Tobacco Use  Never              Alcohol History       Alcohol Use Status  Not Currently              Drug Use       Drug Use Status  Never              Sexual Activity       Sexually Active  Yes Partners  Male                     Review of Systems   Constitutional:  Negative for fever.   HENT:  Negative for hearing loss.    Eyes:  Negative for visual disturbance.   Respiratory:  Negative for cough, shortness of breath and wheezing.    Cardiovascular:  Negative for chest pain and palpitations.   Gastrointestinal:  Negative for

## 2024-05-30 LAB
CHLAMYDIA BY THIN PREP: NORMAL
HPV SAMPLE: NORMAL
HPV SOURCE: NORMAL
HPV, GENOTYPE 16: NORMAL
HPV, GENOTYPE 18: NORMAL
HPV, HIGH RISK OTHER: NORMAL
HPV, INTERPRETATION: NORMAL
N. GONORRHOEAE DNA, THIN PREP: NORMAL

## 2024-06-04 ENCOUNTER — TELEPHONE (OUTPATIENT)
Dept: OBGYN | Age: 24
End: 2024-06-04

## 2024-06-04 LAB
CHLAMYDIA BY THIN PREP: NEGATIVE
GYNECOLOGY CYTOLOGY REPORT: NORMAL
HPV SAMPLE: NORMAL
HPV SOURCE: NORMAL
HPV, GENOTYPE 16: NOT DETECTED
HPV, GENOTYPE 18: NOT DETECTED
HPV, HIGH RISK OTHER: NOT DETECTED
HPV, INTERPRETATION: NORMAL
N. GONORRHOEAE DNA, THIN PREP: NEGATIVE

## 2024-06-04 NOTE — TELEPHONE ENCOUNTER
----- Message from Kylee Justice DO sent at 6/4/2024 10:24 AM EDT -----  Please let her know that her Pap returned low-grade dysplasia the next step in the evaluation of this is a procedure called a colposcopy.  A magnifying scope will sit in front of her I will look at her cervix through it and take biopsies to rule out or in a more significant cervical change.  I will explain the procedure prior to doing anything that day.

## 2024-08-21 ENCOUNTER — PROCEDURE VISIT (OUTPATIENT)
Dept: OBGYN | Age: 24
End: 2024-08-21
Payer: COMMERCIAL

## 2024-08-21 VITALS
BODY MASS INDEX: 27.91 KG/M2 | HEART RATE: 78 BPM | WEIGHT: 138.2 LBS | SYSTOLIC BLOOD PRESSURE: 105 MMHG | DIASTOLIC BLOOD PRESSURE: 72 MMHG

## 2024-08-21 DIAGNOSIS — Z30.42 ENCOUNTER FOR SURVEILLANCE OF INJECTABLE CONTRACEPTIVE: ICD-10-CM

## 2024-08-21 DIAGNOSIS — R87.612 LGSIL ON PAP SMEAR OF CERVIX: ICD-10-CM

## 2024-08-21 DIAGNOSIS — Z01.812 PRE-PROCEDURE LAB EXAM: Primary | ICD-10-CM

## 2024-08-21 LAB
CONTROL: NORMAL
PREGNANCY TEST URINE, POC: NEGATIVE

## 2024-08-21 PROCEDURE — 81025 URINE PREGNANCY TEST: CPT | Performed by: OBSTETRICS & GYNECOLOGY

## 2024-08-21 PROCEDURE — 99214 OFFICE O/P EST MOD 30 MIN: CPT | Performed by: OBSTETRICS & GYNECOLOGY

## 2024-08-21 PROCEDURE — 57455 BIOPSY OF CERVIX W/SCOPE: CPT | Performed by: OBSTETRICS & GYNECOLOGY

## 2024-08-21 RX ORDER — MEDROXYPROGESTERONE ACETATE 150 MG/ML
150 INJECTION, SUSPENSION INTRAMUSCULAR ONCE
Status: SHIPPED | OUTPATIENT
Start: 2024-08-21

## 2024-08-21 NOTE — PROGRESS NOTES
Preop LGSIL              Negative HPV    Postop same    Surgeon Reshma SHELTON    Informed consent and timeout performed    Procedure colposcopy of the cervix upper and adjacent vagina with biopsies    Procedure the vagina and cervix were cleansed with sterile water.     A 3 to 5% acetic acid solution was applied to the cervix     Squamocolumnar junction utilized 360    Acetowhite 4:00    Punctations 11:00    Mosaics none  Hemostasis achieved with silver nitrate and Monsel's      Tolerated procedure well  Vaginal rest x72 hours  Report bleeding soaking through 2 pads in an hour  Fever 100.4 or above  We will call with pathology report

## 2024-08-21 NOTE — PROGRESS NOTES
Here today for colposcopy. Instructed on the procedure of colposcopy, voiced understanding and permit signed for colposcopy with possible biopsies.  Urine pregnancy done with negative results.  Prepared for procedure.  Time out done by DR. Justice prior to starting the procedure.  Tolerated procedure.  No bleeding noted after procedure, cortez pad applied.  Specimens obtained, labeled and sent to the lab.  To return in one week for results.  Discharge instructions reviewed verbally and written AVS given to patient.  Voiced understanding and agreement.

## 2024-08-21 NOTE — PATIENT INSTRUCTIONS
care for yourself at home?  Activity    You can return to work and most daily activities right after the test.   Exercise    Do not exercise for 1 day after the test.   Medicines    Your doctor will tell you if and when you can restart your medicines. You will also get instructions about taking any new medicines.     If you take aspirin or some other blood thinner, ask your doctor if and when to start taking it again. Make sure that you understand exactly what your doctor wants you to do.     Take an over-the-counter pain medicine, such as acetaminophen (Tylenol), ibuprofen (Advil, Motrin), or naproxen (Aleve). Be safe with medicines. Read and follow all instructions on the label. Do not take two or more pain medicines at the same time unless the doctor told you to. Many pain medicines have acetaminophen, which is Tylenol. Too much acetaminophen (Tylenol) can be harmful.   Other instructions    Some vaginal bleeding or discharge is normal for up to a week after a biopsy. The discharge may be dark-colored. Use a pad if you have some bleeding.     Do not douche, have sexual intercourse, or use tampons for 1 week if you had a biopsy. This will allow time for your cervix to heal.     You can take a bath or shower anytime after the test.   Follow-up care is a key part of your treatment and safety. Be sure to make and go to all appointments, and call your doctor if you are having problems. It's also a good idea to know your test results and keep alist of the medicines you take.  When should you call for help?   Call your doctor now or seek immediate medical care if:    You have severe vaginal bleeding. This means that you are soaking through your usual pads or tampons each hour for 2 or more hours.     You have pain that does not get better after you take pain medicine.     You have signs of infection, such as:  Increased pain.  Bad-smelling vaginal discharge.  A fever.   Watch closely for any changes in your health, and

## 2024-08-27 ENCOUNTER — TELEPHONE (OUTPATIENT)
Dept: OBGYN | Age: 24
End: 2024-08-27

## 2024-08-27 LAB — SURGICAL PATHOLOGY REPORT: NORMAL

## 2024-08-27 NOTE — TELEPHONE ENCOUNTER
Phone call to review colpo results which showed inflammation.  Follow with repeat Pap in 6 months notified expressed understanding front office will call her to schedule

## 2025-02-03 ENCOUNTER — OFFICE VISIT (OUTPATIENT)
Dept: FAMILY MEDICINE CLINIC | Age: 25
End: 2025-02-03

## 2025-02-03 VITALS
SYSTOLIC BLOOD PRESSURE: 114 MMHG | RESPIRATION RATE: 17 BRPM | HEART RATE: 106 BPM | OXYGEN SATURATION: 98 % | TEMPERATURE: 97.9 F | BODY MASS INDEX: 25.81 KG/M2 | WEIGHT: 127.8 LBS | DIASTOLIC BLOOD PRESSURE: 70 MMHG

## 2025-02-03 DIAGNOSIS — E55.9 VITAMIN D DEFICIENCY: ICD-10-CM

## 2025-02-03 DIAGNOSIS — Z23 FLU VACCINE NEED: ICD-10-CM

## 2025-02-03 DIAGNOSIS — Z00.00 ENCOUNTER FOR MEDICAL EXAMINATION TO ESTABLISH CARE: Primary | ICD-10-CM

## 2025-02-03 DIAGNOSIS — R73.9 HYPERGLYCEMIA: ICD-10-CM

## 2025-02-03 DIAGNOSIS — F32.A ANXIETY AND DEPRESSION: ICD-10-CM

## 2025-02-03 DIAGNOSIS — G43.809 OTHER MIGRAINE WITHOUT STATUS MIGRAINOSUS, NOT INTRACTABLE: ICD-10-CM

## 2025-02-03 DIAGNOSIS — F41.9 ANXIETY AND DEPRESSION: ICD-10-CM

## 2025-02-03 DIAGNOSIS — R53.82 CHRONIC FATIGUE: ICD-10-CM

## 2025-02-03 DIAGNOSIS — Z86.19 HISTORY OF POSITIVE PCR FOR HERPES SIMPLEX VIRUS TYPE 2 (HSV-2) DNA: ICD-10-CM

## 2025-02-03 DIAGNOSIS — K21.9 GASTROESOPHAGEAL REFLUX DISEASE WITHOUT ESOPHAGITIS: ICD-10-CM

## 2025-02-03 DIAGNOSIS — L68.9 EXCESSIVE HAIR GROWTH: ICD-10-CM

## 2025-02-03 LAB
BASOPHILS ABSOLUTE: 0.08 K/UL (ref 0–0.2)
BASOPHILS RELATIVE PERCENT: 1 % (ref 0–2)
EOSINOPHILS ABSOLUTE: 0.35 K/UL (ref 0.05–0.5)
EOSINOPHILS RELATIVE PERCENT: 4 % (ref 0–6)
HBA1C MFR BLD: 4.8 % (ref 4–5.6)
HCT VFR BLD CALC: 41.9 % (ref 34–48)
HEMOGLOBIN: 14.4 G/DL (ref 11.5–15.5)
IMMATURE GRANULOCYTES %: 1 % (ref 0–5)
IMMATURE GRANULOCYTES ABSOLUTE: 0.04 K/UL (ref 0–0.58)
LYMPHOCYTES ABSOLUTE: 3.55 K/UL (ref 1.5–4)
LYMPHOCYTES RELATIVE PERCENT: 43 % (ref 20–42)
MCH RBC QN AUTO: 31.1 PG (ref 26–35)
MCHC RBC AUTO-ENTMCNC: 34.4 G/DL (ref 32–34.5)
MCV RBC AUTO: 90.5 FL (ref 80–99.9)
MONOCYTES ABSOLUTE: 0.63 K/UL (ref 0.1–0.95)
MONOCYTES RELATIVE PERCENT: 8 % (ref 2–12)
NEUTROPHILS ABSOLUTE: 3.57 K/UL (ref 1.8–7.3)
NEUTROPHILS RELATIVE PERCENT: 43 % (ref 43–80)
PDW BLD-RTO: 12.3 % (ref 11.5–15)
PLATELET # BLD: 335 K/UL (ref 130–450)
PMV BLD AUTO: 11.3 FL (ref 7–12)
RBC # BLD: 4.63 M/UL (ref 3.5–5.5)
WBC # BLD: 8.2 K/UL (ref 4.5–11.5)

## 2025-02-03 RX ORDER — VALACYCLOVIR HYDROCHLORIDE 500 MG/1
500 TABLET, FILM COATED ORAL DAILY
Qty: 30 TABLET | Refills: 1 | Status: SHIPPED | OUTPATIENT
Start: 2025-02-03

## 2025-02-03 RX ORDER — CHOLECALCIFEROL (VITAMIN D3) 25 MCG
1 TABLET ORAL DAILY
Qty: 30 TABLET | Refills: 2 | Status: SHIPPED | OUTPATIENT
Start: 2025-02-03

## 2025-02-03 RX ORDER — PROPRANOLOL HYDROCHLORIDE 10 MG/1
10 TABLET ORAL NIGHTLY
Qty: 90 TABLET | Refills: 0 | Status: SHIPPED | OUTPATIENT
Start: 2025-02-03

## 2025-02-03 RX ORDER — RIZATRIPTAN BENZOATE 10 MG/1
10 TABLET ORAL
Qty: 30 TABLET | Refills: 3 | Status: SHIPPED | OUTPATIENT
Start: 2025-02-03 | End: 2025-02-03

## 2025-02-03 SDOH — ECONOMIC STABILITY: FOOD INSECURITY: WITHIN THE PAST 12 MONTHS, THE FOOD YOU BOUGHT JUST DIDN'T LAST AND YOU DIDN'T HAVE MONEY TO GET MORE.: NEVER TRUE

## 2025-02-03 SDOH — ECONOMIC STABILITY: FOOD INSECURITY: WITHIN THE PAST 12 MONTHS, YOU WORRIED THAT YOUR FOOD WOULD RUN OUT BEFORE YOU GOT MONEY TO BUY MORE.: NEVER TRUE

## 2025-02-03 ASSESSMENT — ENCOUNTER SYMPTOMS
BACK PAIN: 0
EYE PAIN: 0
NAUSEA: 0
EYE REDNESS: 0
COUGH: 0
SINUS PAIN: 0
SINUS PRESSURE: 0
SORE THROAT: 0
DIARRHEA: 0
VOMITING: 0
ABDOMINAL PAIN: 0
CONSTIPATION: 0
RHINORRHEA: 0
SHORTNESS OF BREATH: 0

## 2025-02-03 ASSESSMENT — PATIENT HEALTH QUESTIONNAIRE - PHQ9
SUM OF ALL RESPONSES TO PHQ QUESTIONS 1-9: 4
SUM OF ALL RESPONSES TO PHQ QUESTIONS 1-9: 4
10. IF YOU CHECKED OFF ANY PROBLEMS, HOW DIFFICULT HAVE THESE PROBLEMS MADE IT FOR YOU TO DO YOUR WORK, TAKE CARE OF THINGS AT HOME, OR GET ALONG WITH OTHER PEOPLE: NOT DIFFICULT AT ALL
SUM OF ALL RESPONSES TO PHQ QUESTIONS 1-9: 4
9. THOUGHTS THAT YOU WOULD BE BETTER OFF DEAD, OR OF HURTING YOURSELF: NOT AT ALL
4. FEELING TIRED OR HAVING LITTLE ENERGY: MORE THAN HALF THE DAYS
SUM OF ALL RESPONSES TO PHQ9 QUESTIONS 1 & 2: 0
5. POOR APPETITE OR OVEREATING: NOT AT ALL
3. TROUBLE FALLING OR STAYING ASLEEP: MORE THAN HALF THE DAYS
8. MOVING OR SPEAKING SO SLOWLY THAT OTHER PEOPLE COULD HAVE NOTICED. OR THE OPPOSITE, BEING SO FIGETY OR RESTLESS THAT YOU HAVE BEEN MOVING AROUND A LOT MORE THAN USUAL: NOT AT ALL
6. FEELING BAD ABOUT YOURSELF - OR THAT YOU ARE A FAILURE OR HAVE LET YOURSELF OR YOUR FAMILY DOWN: NOT AT ALL
2. FEELING DOWN, DEPRESSED OR HOPELESS: NOT AT ALL
1. LITTLE INTEREST OR PLEASURE IN DOING THINGS: NOT AT ALL
7. TROUBLE CONCENTRATING ON THINGS, SUCH AS READING THE NEWSPAPER OR WATCHING TELEVISION: NOT AT ALL
SUM OF ALL RESPONSES TO PHQ QUESTIONS 1-9: 4

## 2025-02-03 NOTE — PROGRESS NOTES
2/3/2025    [facility]  Chief Complaint   Patient presents with    New Patient       Irma Willingham is a 24 y.o. female who  has a past medical history of Anxiety, Depression, Headache, Positive test for herpes simplex virus (HSV) antibody, and Tremor.     Patient is here to establish care myself.  Patient has no known allergies.  Surgical history includes a knee and wrist surgery in the past.  Patient recently moved here from [place] a few months ago.    Headache  Patient presents for evaluation of headache. Symptoms began about several years ago. Generally, the headaches last about several hours to days and occur 3-4 times a week. The headaches do not seem to be related to any time of day or year. The headaches are located in temples and the neck.  The patient rates her most severe headaches a 10 on a scale from 1 to 10. Recently, the headaches have been increasing in both severity and frequency. Work attendance or other daily activities can be affected by the headaches. Associated neurologic symptoms: decreased physical activity. The patient denies speech difficulties, vision problems, and vomiting in the early morning. Home treatment has included ibuprofen, darkening the room, resting, and sleeping with no improvement. Other history includes: migraine headaches diagnosed in the past. Family history includes migraine headaches in mother.    Depression  Patient complains of depression. She complains of anhedonia, depressed mood, and fatigue. Onset was approximately several  years  ago. Symptoms have been gradually worsening since that time.  Patient denies psychomotor agitation, psychomotor retardation, recurrent thoughts of death, suicidal attempt, suicidal thoughts with specific plan, and suicidal thoughts without plan. Family history significant for anxiety and depression. Possible organic causes contributing are: none. Risk factors: previous episode of depression. Previous treatment includes medication.

## 2025-02-04 LAB
ALBUMIN: 4.3 G/DL (ref 3.5–5.2)
ALP BLD-CCNC: 67 U/L (ref 35–104)
ALT SERPL-CCNC: 11 U/L (ref 0–32)
ANION GAP SERPL CALCULATED.3IONS-SCNC: 15 MMOL/L (ref 7–16)
AST SERPL-CCNC: 15 U/L (ref 0–31)
BILIRUB SERPL-MCNC: 0.5 MG/DL (ref 0–1.2)
BUN BLDV-MCNC: 15 MG/DL (ref 6–20)
CALCIUM SERPL-MCNC: 9.2 MG/DL (ref 8.6–10.2)
CHLORIDE BLD-SCNC: 101 MMOL/L (ref 98–107)
CO2: 21 MMOL/L (ref 22–29)
CREAT SERPL-MCNC: 0.7 MG/DL (ref 0.5–1)
FOLLICLE STIMULATING HORMONE: 3.9 MIU/ML
GFR, ESTIMATED: >90 ML/MIN/1.73M2
GLUCOSE BLD-MCNC: 88 MG/DL (ref 74–99)
LH: 9 MIU/ML
POTASSIUM SERPL-SCNC: 3.8 MMOL/L (ref 3.5–5)
PROLACTIN: 14.63 NG/ML
SODIUM BLD-SCNC: 137 MMOL/L (ref 132–146)
TOTAL PROTEIN: 7.1 G/DL (ref 6.4–8.3)
TSH SERPL DL<=0.05 MIU/L-ACNC: 1.92 UIU/ML (ref 0.27–4.2)
VITAMIN D 25-HYDROXY: 13.3 NG/ML (ref 30–100)

## 2025-02-05 DIAGNOSIS — E55.9 VITAMIN D DEFICIENCY: Primary | ICD-10-CM

## 2025-02-05 LAB
DHEAS (DHEA SULFATE): 176 UG/DL (ref 98.8–340)
INSULIN COMMENT: NORMAL
INSULIN REFERENCE RANGE:: NORMAL
INSULIN: 9.6 MU/L
SEX HORMONE BINDING GLOBULIN: 40 NMOL/L (ref 25–122)
TESTOSTERONE FREE-NONMALE: 4 PG/ML (ref 0.8–7.4)
TESTOSTERONE TOTAL: 25 NG/DL (ref 8–48)
TESTOSTERONE, BIOAVAILABLE: 9.3 NG/DL (ref 2.2–20.6)

## 2025-02-05 RX ORDER — ERGOCALCIFEROL 1.25 MG/1
50000 CAPSULE, LIQUID FILLED ORAL WEEKLY
Qty: 12 CAPSULE | Refills: 1 | Status: SHIPPED | OUTPATIENT
Start: 2025-02-05

## 2025-02-07 LAB
ESTRADIOL LEVEL: 120.9 PG/ML
ESTROGEN TOTAL: 172.7 PG/ML
ESTRONE: 51.8 PG/ML

## 2025-07-09 DIAGNOSIS — Z86.19 HISTORY OF POSITIVE PCR FOR HERPES SIMPLEX VIRUS TYPE 2 (HSV-2) DNA: ICD-10-CM

## 2025-07-09 RX ORDER — VALACYCLOVIR HYDROCHLORIDE 500 MG/1
500 TABLET, FILM COATED ORAL DAILY
Qty: 30 TABLET | Refills: 1 | Status: SHIPPED | OUTPATIENT
Start: 2025-07-09

## 2025-07-09 NOTE — TELEPHONE ENCOUNTER
Name of Medication(s) Requested:  Requested Prescriptions     Pending Prescriptions Disp Refills    valACYclovir (VALTREX) 500 MG tablet [Pharmacy Med Name: VALACYCLOVIR 500MG TABLETS] 30 tablet 1     Sig: TAKE 1 TABLET BY MOUTH DAILY       Medication is on current medication list Yes    Dosage and directions were verified? Yes    Quantity verified: 30 day supply     Pharmacy Verified?  Yes    Last Appointment:  2/3/2025    Future appts:  No future appointments.     (If no appt send self scheduling link. .REFILLAPPT)  Scheduling request sent?     [] Yes  [x] No    Does patient need updated?  [] Yes  [x] No

## 2025-07-16 DIAGNOSIS — G43.809 OTHER MIGRAINE WITHOUT STATUS MIGRAINOSUS, NOT INTRACTABLE: ICD-10-CM

## 2025-07-16 RX ORDER — PROPRANOLOL HYDROCHLORIDE 10 MG/1
10 TABLET ORAL NIGHTLY
Qty: 90 TABLET | Refills: 0 | Status: SHIPPED | OUTPATIENT
Start: 2025-07-16

## 2025-07-16 NOTE — TELEPHONE ENCOUNTER
Name of Medication(s) Requested:  Requested Prescriptions     Pending Prescriptions Disp Refills    propranolol (INDERAL) 10 MG immediate release tablet [Pharmacy Med Name: PROPRANOLOL 10MG TABLETS] 90 tablet 0     Sig: TAKE 1 TABLET BY MOUTH AT BEDTIME       Medication is on current medication list Yes    Dosage and directions were verified? Yes    Quantity verified: 90 day supply     Pharmacy Verified?  Yes    Last Appointment:  2/3/2025    Future appts:  No future appointments.     (If no appt send self scheduling link. .REFILLAPPT)  Scheduling request sent?     [] Yes  [x] No    Does patient need updated?  [] Yes  [x] No

## 2025-08-06 ENCOUNTER — TELEPHONE (OUTPATIENT)
Age: 25
End: 2025-08-06

## 2025-08-07 DIAGNOSIS — K21.9 GASTROESOPHAGEAL REFLUX DISEASE WITHOUT ESOPHAGITIS: ICD-10-CM

## 2025-08-07 RX ORDER — LIDOCAINE HYDROCHLORIDE 20 MG/ML
JELLY TOPICAL PRN
Qty: 1 EACH | Refills: 0 | Status: SHIPPED | OUTPATIENT
Start: 2025-08-07

## 2025-08-07 RX ORDER — VALACYCLOVIR HYDROCHLORIDE 1 G/1
1000 TABLET, FILM COATED ORAL DAILY
Qty: 5 TABLET | Refills: 0 | Status: SHIPPED | OUTPATIENT
Start: 2025-08-07 | End: 2025-08-12

## 2025-08-07 RX ORDER — OMEPRAZOLE 20 MG/1
20 CAPSULE, DELAYED RELEASE ORAL DAILY
Qty: 30 CAPSULE | Refills: 2 | Status: SHIPPED | OUTPATIENT
Start: 2025-08-07

## 2025-08-11 RX ORDER — LIDOCAINE HYDROCHLORIDE 20 MG/ML
JELLY TOPICAL PRN
Qty: 1 EACH | Refills: 0 | OUTPATIENT
Start: 2025-08-11

## 2025-08-12 ENCOUNTER — TELEPHONE (OUTPATIENT)
Dept: FAMILY MEDICINE CLINIC | Age: 25
End: 2025-08-12

## 2025-08-12 DIAGNOSIS — L01.00 IMPETIGO: Primary | ICD-10-CM

## 2025-08-12 DIAGNOSIS — F32.A ANXIETY AND DEPRESSION: ICD-10-CM

## 2025-08-12 DIAGNOSIS — F41.9 ANXIETY AND DEPRESSION: ICD-10-CM

## 2025-08-12 RX ORDER — CEPHALEXIN 500 MG/1
500 CAPSULE ORAL 4 TIMES DAILY
Qty: 40 CAPSULE | Refills: 0 | Status: SHIPPED | OUTPATIENT
Start: 2025-08-12 | End: 2025-08-22

## 2025-08-12 RX ORDER — VALACYCLOVIR HYDROCHLORIDE 500 MG/1
500 TABLET, FILM COATED ORAL 2 TIMES DAILY
Qty: 10 TABLET | Refills: 2 | Status: SHIPPED | OUTPATIENT
Start: 2025-08-12

## 2025-08-29 ENCOUNTER — TELEMEDICINE ON DEMAND (OUTPATIENT)
Age: 25
End: 2025-08-29
Payer: COMMERCIAL

## 2025-08-29 DIAGNOSIS — L01.00 IMPETIGO: Primary | ICD-10-CM

## 2025-08-29 PROCEDURE — 99213 OFFICE O/P EST LOW 20 MIN: CPT | Performed by: NURSE PRACTITIONER

## 2025-08-29 RX ORDER — MUPIROCIN 2 %
OINTMENT (GRAM) TOPICAL
Qty: 22 G | Refills: 0 | Status: SHIPPED | OUTPATIENT
Start: 2025-08-29 | End: 2025-09-05